# Patient Record
Sex: FEMALE | Race: WHITE
[De-identification: names, ages, dates, MRNs, and addresses within clinical notes are randomized per-mention and may not be internally consistent; named-entity substitution may affect disease eponyms.]

---

## 2020-06-29 ENCOUNTER — HOSPITAL ENCOUNTER (EMERGENCY)
Dept: HOSPITAL 33 - ED | Age: 35
Discharge: HOME | End: 2020-06-29
Payer: MEDICAID

## 2020-06-29 VITALS — SYSTOLIC BLOOD PRESSURE: 141 MMHG | DIASTOLIC BLOOD PRESSURE: 82 MMHG | OXYGEN SATURATION: 99 %

## 2020-06-29 VITALS — HEART RATE: 71 BPM

## 2020-06-29 DIAGNOSIS — O20.9: Primary | ICD-10-CM

## 2020-06-29 LAB
ALBUMIN SERPL-MCNC: 3.9 G/DL (ref 3.5–5)
ALP SERPL-CCNC: 71 U/L (ref 38–126)
ALT SERPL-CCNC: 18 U/L (ref 0–35)
ANION GAP SERPL CALC-SCNC: 10.8 MEQ/L (ref 5–15)
AST SERPL QL: 18 U/L (ref 14–36)
BASOPHILS # BLD AUTO: 0.06 10*3/UL (ref 0–0.4)
BASOPHILS NFR BLD AUTO: 0.4 % (ref 0–0.4)
BILIRUB BLD-MCNC: 0.3 MG/DL (ref 0.2–1.3)
BUN SERPL-MCNC: 10 MG/DL (ref 7–17)
CALCIUM SPEC-MCNC: 9.5 MG/DL (ref 8.4–10.2)
CHLORIDE SERPL-SCNC: 109 MMOL/L (ref 98–107)
CO2 SERPL-SCNC: 21 MMOL/L (ref 22–30)
CREAT SERPL-MCNC: 0.53 MG/DL (ref 0.52–1.04)
EOSINOPHIL # BLD AUTO: 0.39 10*3/UL (ref 0–0.5)
GLUCOSE SERPL-MCNC: 124 MG/DL (ref 74–106)
GLUCOSE UR-MCNC: NEGATIVE MG/DL
HCG SERPL-ACNC: (no result) MIU/ML
HCT VFR BLD AUTO: 42.5 % (ref 35–47)
HGB BLD-MCNC: 14.4 GM/DL (ref 12–16)
LYMPHOCYTES # SPEC AUTO: 2.89 10*3/UL (ref 1–4.6)
MCH RBC QN AUTO: 31.4 PG (ref 26–32)
MCHC RBC AUTO-ENTMCNC: 33.9 G/DL (ref 32–36)
MONOCYTES # BLD AUTO: 1.1 10*3/UL (ref 0–1.3)
PLATELET # BLD AUTO: 251 K/MM3 (ref 150–450)
POTASSIUM SERPLBLD-SCNC: 3.7 MMOL/L (ref 3.5–5.1)
PROT SERPL-MCNC: 6.8 G/DL (ref 6.3–8.2)
PROT UR STRIP-MCNC: NEGATIVE MG/DL
RBC # BLD AUTO: 4.59 M/MM3 (ref 4.1–5.4)
RBC #/AREA URNS HPF: (no result) /HPF (ref 0–2)
SODIUM SERPL-SCNC: 137 MMOL/L (ref 137–145)
WBC # BLD AUTO: 14.1 K/MM3 (ref 4–10.5)
WBC #/AREA URNS HPF: (no result) /HPF (ref 0–5)

## 2020-06-29 PROCEDURE — 36415 COLL VENOUS BLD VENIPUNCTURE: CPT

## 2020-06-29 PROCEDURE — 99284 EMERGENCY DEPT VISIT MOD MDM: CPT

## 2020-06-29 PROCEDURE — 84702 CHORIONIC GONADOTROPIN TEST: CPT

## 2020-06-29 PROCEDURE — 81001 URINALYSIS AUTO W/SCOPE: CPT

## 2020-06-29 PROCEDURE — 80053 COMPREHEN METABOLIC PANEL: CPT

## 2020-06-29 PROCEDURE — 85025 COMPLETE CBC W/AUTO DIFF WBC: CPT

## 2020-06-29 PROCEDURE — 36000 PLACE NEEDLE IN VEIN: CPT

## 2020-06-29 NOTE — ERPHSYRPT
- History of Present Illness


Time Seen by Provider: 20 04:10


Source: patient, family


Exam Limitations: no limitations


Patient Subjective Stated Complaint: pt reports "light period bleeding" after 

intercourse; pt reports blood on toilet paper after using restroom immediately 

after intercourse; pt also reports using restroom upon arrival to ED and did not

note any bleeding


Triage Nursing Assessment: a/ox4; resp non labored and regular; skin p/w/d; 

ambualtes without difficulty


Physician History: 





This is a 34-year-old white female who is approximately 8-1/2 weeks pregnant and

presents with vaginal bleeding that occurred after sexual intercourse early this

morning.  She is not felt any pain.  She describes the pain is more of a light 

menstrual period.  She has had no urinary symptoms.  She has no abdominal pain. 

This is her second pregnancy.  Patient has an appointment to see an obstetrician

tomorrow, 2020.  She was scheduled to have an ultrasound tomorrow.


Timing/Duration: today


Activites at Onset: sexual activity


Quality: other (None)


Pain Radiation: vaginal (Bleeding)


Severity of Pain-Max: none


Severity of Pain-Current: none


Prior abdominal problems: none


Sexual intercourse history: single partner, other (Vaginal bleeding occurred 

after sexual intercourse)


Associated Symptoms: vaginal discharge, No abdominal pain, No nausea, No 

vomiting, No dysuria


Hx Tetanus, Diphtheria Vaccination/Date Given: No


Hx Influenza Vaccination/Date Given: No


Hx Pneumococcal Vaccination/Date Given: No





Travel Risk





- International Travel


Have you traveled outside of the country in past 3 weeks: No





- Coronavirus Screening


Are you exhibiting any of the following symptoms?: No


Close contact with a COVID-19 positive Pt in past 14-21 Days: No





- Review of Systems


Constitutional: No Symptoms


Eyes: No Symptoms


Ears, Nose, & Throat: No Symptoms


Respiratory: No Symptoms


Cardiac: No Symptoms


Abdominal/Gastrointestinal: No Symptoms


Genitourinary Symptoms: Vaginal Bleeding (After this morning's sexual 

intercourse)


Musculoskeletal: No Symptoms


Skin: No Symptoms


Neurological: No Symptoms


Psychological: No Symptoms


Endocrine: No Symptoms


Hematologic/Lymphatic: No Symptoms


Immunological/Allergic: No Symptoms


All Other Systems: Reviewed and Negative





- Past Medical History


Pertinent Past Medical History: No


Neurological History: No Pertinent History


ENT History: No Pertinent History


Cardiac History: No Pertinent History


Respiratory History: No Pertinent History


Endocrine Medical History: No Pertinent History


Musculoskeletal History: No Pertinent History


GI Medical History: No Pertinent History


 History: No Pertinent History


Psycho-Social History: No Pertinent History


Female Reproductive Disorders: No Pertinent History





- Past Surgical History


Neuro Surgical History: No Pertinent History


Cardiac: No Pertinent History


Respiratory: No Pertinent History


Gastrointestinal: No Pertinent History


Genitourinary: No Pertinent History


Musculoskeletal: No Pertinent History


Female Surgical History: Other


Other Surgical History: 





- Social History


Smoking Status: Current every day smoker


Exposure to second hand smoke: Yes


Drug Use: none


Patient Lives Alone: No





- Female History


Hx Pregnant Now: Yes


Gestational Age: 8.5 weeks





- Nursing Vital Signs


Nursing Vital Signs: 


                               Initial Vital Signs











Temperature  98.4 F   20 04:04


 


Pulse Rate  94 H  20 04:04


 


Respiratory Rate  18   20 04:04


 


Blood Pressure  141/82   20 04:04


 


O2 Sat by Pulse Oximetry  99   20 04:04








                                   Pain Scale











Pain Intensity                 0

















- Physical Exam


General Appearance: no apparent distress, alert, anxiety


Eye Exam: PERRL/EOMI, eyes nml inspection


Ears, Nose, Throat Exam: normal ENT inspection, moist mucous membranes


Neck Exam: normal inspection, non-tender, supple, full range of motion


Respiratory Exam: normal breath sounds, lungs clear, No chest tenderness, No 

respiratory distress, No airway intact


Cardiovascular Exam: regular rate/rhythm, normal heart sounds, normal peripheral

pulses


Gastrointestinal/Abdomen Exam: soft, normal bowel sounds, No tenderness, No 

guarding


Pelvic Exam: not done


Rectal Exam: not done


Back Exam: normal inspection, normal range of motion, No CVA tenderness, No 

vertebral tenderness


Extremity Exam: normal inspection, normal range of motion, pelvis stable


Neurologic Exam: alert, oriented x 3, cooperative, CNs II-XII nml as tested, 

normal mood/affect, nml cerebellar function, nml station & gait, sensation nml


Skin Exam: normal color, warm, dry


Lymphatic Exam: No adenopathy


**SpO2 Interpretation**: normal


SpO2: 99


O2 Delivery: Room Air





- Course


Nursing assessment & vital signs reviewed: Yes


Ordered Tests: 


                               Active Orders 24 hr











 Category Date Time Status


 


 IV Insertion STAT Care  20 04:09 Active


 


 CBC W DIFF Stat Lab  20 04:22 Completed


 


 CMP Stat Lab  20 04:22 Completed


 


 HCG, Quantitative (Inhouse) Stat Lab  20 04:22 Completed


 


 UA W/RFX UR CULTURE Stat Lab  20 04:37 Completed











Lab/Rad Data: 


                           Laboratory Result Diagrams





                                 20 04:22 





                                 20 04:22 





                               Laboratory Results











  20 Range/Units





  04:37 04:22 04:22 


 


WBC    14.1 H  (4.0-10.5)  K/mm3


 


RBC    4.59  (4.1-5.4)  M/mm3


 


Hgb    14.4  (12.0-16.0)  gm/dl


 


Hct    42.5  (35-47)  %


 


MCV    92.6  ()  fl


 


MCH    31.4  (26-32)  pg


 


MCHC    33.9  (32-36)  g/dl


 


RDW    13.1  (11.5-14.0)  %


 


Plt Count    251  (150-450)  K/mm3


 


MPV    10.7  (7.5-11.0)  fl


 


Gran %    68.5 H  (36.0-66.0)  %


 


Eos # (Auto)    0.39  (0-0.5)  


 


Absolute Lymphs (auto)    2.89  (1.0-4.6)  


 


Absolute Monos (auto)    1.10  (0.0-1.3)  


 


Lymphocytes %    20.5 L  (24.0-44.0)  %


 


Monocytes %    7.8  (0.0-12.0)  %


 


Eosinophils %    2.8  (0.00-5.0)  %


 


Basophils %    0.4  (0.0-0.4)  %


 


Absolute Granulocytes    9.65 H  (1.4-6.9)  


 


Basophils #    0.06  (0-0.4)  


 


Sodium   137   (137-145)  mmol/L


 


Potassium   3.7   (3.5-5.1)  mmol/L


 


Chloride   109 H   ()  mmol/L


 


Carbon Dioxide   21 L   (22-30)  mmol/L


 


Anion Gap   10.8   (5-15)  MEQ/L


 


BUN   10   (7-17)  mg/dL


 


Creatinine   0.53   (0.52-1.04)  mg/dL


 


Estimated GFR   > 60.0   ML/MIN


 


Glucose   124 H   ()  mg/dL


 


Calcium   9.5   (8.4-10.2)  mg/dL


 


Total Bilirubin   0.30   (0.2-1.3)  mg/dL


 


AST   18   (14-36)  U/L


 


ALT   18   (0-35)  U/L


 


Alkaline Phosphatase   71   ()  U/L


 


Serum Total Protein   6.8   (6.3-8.2)  g/dL


 


Albumin   3.9   (3.5-5.0)  g/dL


 


Beta HCG, Quant   22066   mIU/ml


 


Urine Color  COLORLESS    (YELLOW)  


 


Urine Appearance  CLEAR    (CLEAR)  


 


Urine pH  6.0    (5-6)  


 


Ur Specific Gravity  1.001    (1.005-1.025)  


 


Urine Protein  NEGATIVE    (Negative)  


 


Urine Ketones  NEGATIVE    (NEGATIVE)  


 


Urine Blood  MODERATE    (0-5)  Javi/ul


 


Urine Nitrite  NEGATIVE    (NEGATIVE)  


 


Urine Bilirubin  NEGATIVE    (NEGATIVE)  


 


Urine Urobilinogen  NEGATIVE    (0-1)  mg/dL


 


Ur Leukocyte Esterase  NEGATIVE    (NEGATIVE)  


 


Urine WBC (Auto)  NONE    (0-5)  /HPF


 


Urine RBC (Auto)  NONE SEEN    (0-2)  /HPF


 


U Epithel Cells (Auto)  NONE    (FEW)  /HPF


 


Urine Bacteria (Auto)  NONE SEEN    (NEGATIVE)  /HPF


 


Urine Culture Reflexed  NO    (NO)  


 


Urine Glucose  NEGATIVE    (NEGATIVE)  mg/dL














- Progress


Progress: unchanged


Air Movement: good


Progress Note: 





20 05:38


Patient has no further vaginal bleeding.  She has been to the restroom twice and

there is been no bleeding.  She has no abdominal pain.  Her numbers fit her 

gestational age.  I offered her to stay in the emergency department and undergo 

a less than 14-week OB ultrasound at 7:00 this morning.  If she wanted to go 

home and have her for less than 14 weeks OB ultrasound today after 12:00 noon I 

could make arrangements for that.  The patient spoke with her  and they a

re considering foregoing the OB ultrasound today and keep there OB ultrasound 

appointment they have for tomorrow, 2020, at her obstetrician's office.


20 05:43


The patient and her  had a discussion and they want to be discharged to 

home and will follow-up tomorrow with her OB and have the ultrasound performed 

then.


Blood Culture(s) Obtained: No


Antibiotics given: No


Counseled pt/family regarding: lab results, diagnosis, need for follow-up





- Departure


Departure Disposition: Home


Clinical Impression: 


 Vaginal bleeding in pregnant patient after first trimester





Condition: Stable


Critical Care Time: No


Referrals: 


DOCTOR,NO FAMILY [Primary Care Provider] -

## 2021-01-04 ENCOUNTER — HOSPITAL ENCOUNTER (OUTPATIENT)
Dept: HOSPITAL 33 - OB | Age: 36
Setting detail: OBSERVATION
LOS: 1 days | Discharge: HOME | End: 2021-01-05
Attending: FAMILY MEDICINE | Admitting: FAMILY MEDICINE
Payer: COMMERCIAL

## 2021-01-04 DIAGNOSIS — Z3A.36: ICD-10-CM

## 2021-01-04 DIAGNOSIS — O26.893: Primary | ICD-10-CM

## 2021-01-04 DIAGNOSIS — R10.9: ICD-10-CM

## 2021-01-04 LAB
AMPHETAMINES UR QL: NEGATIVE
BARBITURATES UR QL: NEGATIVE
BENZODIAZ UR QL SCN: NEGATIVE
COCAINE UR QL SCN: NEGATIVE
METHADONE UR QL: NEGATIVE
OPIATES UR QL: NEGATIVE
PCP UR QL CFM>20 NG/ML: NEGATIVE
THC UR QL SCN: NEGATIVE

## 2021-01-04 PROCEDURE — 80307 DRUG TEST PRSMV CHEM ANLYZR: CPT

## 2021-01-04 PROCEDURE — G0378 HOSPITAL OBSERVATION PER HR: HCPCS

## 2021-01-04 PROCEDURE — 76805 OB US >/= 14 WKS SNGL FETUS: CPT

## 2021-01-04 PROCEDURE — 81001 URINALYSIS AUTO W/SCOPE: CPT

## 2021-01-05 VITALS — SYSTOLIC BLOOD PRESSURE: 110 MMHG | DIASTOLIC BLOOD PRESSURE: 66 MMHG

## 2021-01-05 VITALS — HEART RATE: 72 BPM | OXYGEN SATURATION: 98 %

## 2021-01-05 LAB
GLUCOSE UR-MCNC: NEGATIVE MG/DL
PROT UR STRIP-MCNC: NEGATIVE MG/DL
RBC #/AREA URNS HPF: (no result) /HPF (ref 0–2)
WBC #/AREA URNS HPF: (no result) /HPF (ref 0–5)

## 2021-01-05 NOTE — XRAY
Indication: Abdomen pain.  History abruption.



2-dimensional OB ultrasound performed.



Comparison: None



There is a single viable intrauterine pregnancy in cephalic presentation.

Fetal heart rate 127 BPM.  Posterior placenta without abnormal retroplacental

fluid.



BPD measures 9.00 cm corresponding to 36 weeks 3 days.

HC measures 32.52 cm corresponding to 36 weeks 6 days.

AC measures 32.89 cm corresponding to 36 weeks 6 days.

FL measures 7.01 cm corresponding to 36 weeks 0 days.

AYLEEN is 11.4 cm.



Impression: Single viable intrauterine pregnancy with mean gestational age 36

weeks 4 days.  Expected date confinement is January 29, 2021.  Nothing acute. REPORT GIVEN TO COCO VASQUES